# Patient Record
Sex: MALE | Race: WHITE | Employment: FULL TIME | URBAN - METROPOLITAN AREA
[De-identification: names, ages, dates, MRNs, and addresses within clinical notes are randomized per-mention and may not be internally consistent; named-entity substitution may affect disease eponyms.]

---

## 2017-09-18 ENCOUNTER — OFFICE VISIT (OUTPATIENT)
Dept: FAMILY MEDICINE CLINIC | Age: 52
End: 2017-09-18

## 2017-09-18 VITALS
BODY MASS INDEX: 24.12 KG/M2 | HEIGHT: 73 IN | DIASTOLIC BLOOD PRESSURE: 84 MMHG | HEART RATE: 96 BPM | OXYGEN SATURATION: 97 % | SYSTOLIC BLOOD PRESSURE: 146 MMHG | WEIGHT: 182 LBS | RESPIRATION RATE: 16 BRPM | TEMPERATURE: 98.2 F

## 2017-09-18 DIAGNOSIS — M79.631 RIGHT FOREARM PAIN: Primary | ICD-10-CM

## 2017-09-18 NOTE — MR AVS SNAPSHOT
Visit Information Date & Time Provider Department Dept. Phone Encounter #  
 9/18/2017  3:40 PM Yi Brunner, Stella Anaya Millan [phone] [id number] Upcoming Health Maintenance Date Due DTaP/Tdap/Td series (1 - Tdap) 5/31/1987 FOBT Q 1 YEAR AGE 50-75 5/31/2016 INFLUENZA AGE 9 TO ADULT 8/1/2017 Allergies as of 9/18/2017  Review Complete On: 9/18/2017 By: Wagner Galindo LPN No Known Allergies Current Immunizations  Reviewed on 11/11/2014 Name Date Influenza Vaccine Serena ) 11/11/2014 Not reviewed this visit You Were Diagnosed With   
  
 Codes Comments Right forearm pain    -  Primary ICD-10-CM: N28.367 ICD-9-CM: 729.5 Vitals BP Pulse Temp Resp Height(growth percentile) Weight(growth percentile) 146/84 96 98.2 °F (36.8 °C) (Oral) 16 6' 1\" (1.854 m) 182 lb (82.6 kg) SpO2 BMI Smoking Status 97% 24.01 kg/m2 Never Smoker Vitals History BMI and BSA Data Body Mass Index Body Surface Area 24.01 kg/m 2 2.06 m 2 Preferred Pharmacy Pharmacy Name Phone [facility] [place], [address] [place] AT [facility] [phone] Your Updated Medication List  
  
Notice  As of 9/18/2017  5:12 PM  
 You have not been prescribed any medications. Introducing [facility]! Dear Doris Ward: Thank you for requesting a [employer] account. Our records indicate that you already have an active [employer] account. You can access your account anytime at [email or web address] Did you know that you can access your hospital and ER discharge instructions at any time in [employer]? You can also review all of your test results from your hospital stay or ER visit. Additional Information If you have questions, please visit the Frequently Asked Questions section of the [employer] website at [email or web address] Remember, MyChart is NOT to be used for urgent needs. For medical emergencies, dial 911. Now available from your iPhone and Android! Please provide this summary of care documentation to your next provider. Your primary care clinician is listed as Chris Sherman. If you have any questions after today's visit, please call [phone].

## 2017-09-18 NOTE — PROGRESS NOTES
Chief Complaint   Patient presents with    Arm Pain     right forearm pain. .. patient states having sharp right forearm pain. .. patient states diffculty to move at times     1. Have you been to the ER, urgent care clinic since your last visit? Hospitalized since your last visit? No    2. Have you seen or consulted any other health care providers outside of the [address] [place] [address] since your last visit? Include any pap smears or colon screening.  No

## 2017-09-18 NOTE — PROGRESS NOTES
HPI     CC: R forearm pain     Tanisha Lyons is a 47 y.o. male with no significant history who presents for R forearm pain. R forearm pain started about 1 week ago, which is when he started working on a new renovation house. 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 No trauma. 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 Does not think he had increased his weight lifting at time of pain. For forearm pain, electrical stimulation with some relief and ibuprofen with no relief. Endorsed weakened R handed . No fevers or chills. Prior to that R elbow was hurting; this started at beginning of June, and stopped about 1 week ago when forearm started. For elbow pain, used compression, Ibuprofen, and electrical stimulation. No history of trauma in the past.        PMHx:  Past Medical History:   Diagnosis Date    Family history of skin cancer     Gout     Nephrolithiasis     Sun-damaged skin        Meds:   No current outpatient prescriptions on file prior to visit. No current facility-administered medications on file prior to visit. Allergies:   No Known Allergies    Smoker:  History   Smoking Status    Never Smoker   Smokeless Tobacco    Never Used       ETOH:   History   Alcohol Use No       FH:   Family History   Problem Relation Age of Onset    Cancer Mother        ROS:  Review of Systems   Constitutional: Negative for activity change, appetite change, chills, diaphoresis, fatigue and fever. Respiratory: Negative for chest tightness and shortness of breath. Cardiovascular: Negative for chest pain. Musculoskeletal: Positive for myalgias. Negative for arthralgias, back pain, gait problem and joint swelling. Skin: Negative for color change, rash and wound. Neurological: Positive for weakness. Negative for dizziness, light-headedness, numbness and headaches.        Physical Exam:  Visit Vitals    BP 146/84    Pulse 96    Temp 98.2 °F (36.8 °C) (Oral)    Resp 16    Ht 6' 1\" (1.854 m)    Wt 182 lb (82.6 kg)    SpO2 97%    BMI 24.01 kg/m2       Wt Readings from Last 3 Encounters:   09/18/17 182 lb (82.6 kg)   03/03/16 179 lb (81.2 kg)   01/15/16 179 lb (81.2 kg)     BP Readings from Last 3 Encounters:   09/18/17 146/84   03/03/16 136/86   01/15/16 138/83        Physical Exam   Constitutional: He is oriented to person, place, and time. He appears well-developed and well-nourished. No distress. HENT:   Head: Normocephalic and atraumatic. Eyes: Conjunctivae are normal. No scleral icterus. Cardiovascular: Normal rate and regular rhythm. No murmur heard. Pulmonary/Chest: Effort normal and breath sounds normal. No respiratory distress. He has no wheezes. Musculoskeletal:   Decreased ROM of R wrist; pain with flexion, extension, internal rotation, and external rotation of R wrist. R wrist nontender to palpation; sensation intact. Dorsal aspect of R forearm TTP ~ 2/3 distal to elbow; Ventral aspect of R forearm nontender. R elbow and R shoulder nontender to palpation, with sensation intact, normal ROM, and normal strength. Negative Tinel's and Phalen's. No lateral epicondylitis   Neurological: He is alert and oriented to person, place, and time. He exhibits normal muscle tone. Coordination normal.   Skin: Skin is warm and dry. No rash noted. He is not diaphoretic. Nursing note and vitals reviewed. Assessment     47 y.o. male presents with no significant history presents with R forearm pain  Patient Active Problem List   Diagnosis Code    Kidney stones N20.0    Elevated blood pressure [id number]       Today's diagnoses are:    ICD-10-CM ICD-9-CM    1. Right forearm pain M79.631 729.5               Plan     1. R forearm pain - likely 2/2 overuse and irritation of muscles and tendons, given that symptoms began soon after starting a new house renovation.  No signs of trauma, fracture, or infection. No signs of carpal tunnel.   - discussed with patient that it is important to rest his forearm and allow for muscles/ tendons to relax and for inflammation to decrease   - Ibuprofen/ NSAIDs  - discussed purchasing a wrist brace to allow for increased wrist support. Follow up as needed    Prior labs and imaging were reviewed. I have discussed the diagnosis with the patient and the intended plan as seen in the above orders. The patient has received an after-visit summary and questions were answered concerning future plans. I have discussed medication side effects and warnings with the patient as well. Patient discussed with Dr. Joanna Giron, Attending Physician.     Joselin Thompson MD, PGY2  Family Medicine Resident

## 2018-07-18 ENCOUNTER — OFFICE VISIT (OUTPATIENT)
Dept: FAMILY MEDICINE CLINIC | Age: 53
End: 2018-07-18

## 2018-07-18 ENCOUNTER — DOCUMENTATION ONLY (OUTPATIENT)
Dept: FAMILY MEDICINE CLINIC | Age: 53
End: 2018-07-18

## 2018-07-18 VITALS
BODY MASS INDEX: 24.52 KG/M2 | HEIGHT: 73 IN | SYSTOLIC BLOOD PRESSURE: 164 MMHG | OXYGEN SATURATION: 97 % | HEART RATE: 89 BPM | WEIGHT: 185 LBS | DIASTOLIC BLOOD PRESSURE: 108 MMHG | TEMPERATURE: 98.1 F | RESPIRATION RATE: 17 BRPM

## 2018-07-18 DIAGNOSIS — R42 DIZZY: Primary | ICD-10-CM

## 2018-07-18 DIAGNOSIS — I10 ESSENTIAL HYPERTENSION: ICD-10-CM

## 2018-07-18 LAB
ALBUMIN UR QL STRIP: 10 MG/L
CREATININE, URINE POC: 10 MG/DL
GLUCOSE DOSE-GTT, POCT, GLDSPOCT: 90
HGB BLD-MCNC: 14 G/DL
MICROALBUMIN/CREAT RATIO POC: <30 MG/G

## 2018-07-18 NOTE — PROGRESS NOTES
Chief Complaint   Patient presents with    Dizziness     light headed and weak feeling     1. Have you been to the ER, urgent care clinic since your last visit? Hospitalized since your last visit? No    2. Have you seen or consulted any other health care providers outside of the [address] [place] [address] since your last visit? Include any pap smears or colon screening.  No      Woke up this morning vision blurry    Back of head hurts

## 2018-07-18 NOTE — PROGRESS NOTES
Chief Complaint   Patient presents with    Dizziness     light headed and weak feeling   :    HPI  Giovanna Dixon is a 47 y.o. male who presents for a acute onset of dizziness, one week  ago and progressively worsening. the context: strep a month ago,no otitis. He has been drinking about 2 L of water a day. States it is hot at work. No sick contact  Described as lightheadedness, worsened by nothing and relieved by sitting. Associated symptoms are wobbly with walking, headaches and blurry vision bilaterally this am lasting for 3 min,fatigue, a lot of anxiety- father in hospital, colleague who passed 2 days ago. Patient has tried nothing. No current symptomsPatient denies weakness, numbness,ringing in ear, or hearing change, palpitation, fever, chills  CP/ SOB/ N/V/ diarrhea. Home BP in 140s/90s    Allergies - reviewed:   No Known Allergies    Meds - reviewed:       PMH- reviewed:  Past Medical History:   Diagnosis Date    Family history of skin cancer     Gout     Nephrolithiasis     Sun-damaged skin        SH- reviewed:  Smoker:  History   Smoking Status    Never Smoker   Smokeless Tobacco    Never Used       ETOH- reviewed:   History   Alcohol Use No       FH- reviewed:   Family History   Problem Relation Age of Onset    Cancer Mother          ROS: Positive for Items in bold: Comprehensive is neg except as in HPI       Physical Exam:  Visit Vitals    BP (!) 164/108  Comment: standing    Pulse 89    Temp 98.1 °F (36.7 °C) (Oral)    Resp 17    Ht 6' 1\" (1.854 m)    Wt 185 lb (83.9 kg)    SpO2 97%    BMI 24.41 kg/m2     Vitals:    07/18/18 1114 07/18/18 1120 07/18/18 1121 07/18/18 1122   BP: 137/84 (!) 159/99  Comment: laying down (!) 166/95  Comment: sitting (!) 164/108  Comment: standing   Pulse: (!) 101 83 88 89   Resp: 17      Temp: 98.1 °F (36.7 °C)      TempSrc: Oral      SpO2: 97%      Weight: 185 lb (83.9 kg)      Height: 6' 1\" (1.854 m)           Gen: No apparent distress.  Alert and oriented and responds to all questions appropriately. Eyes: Conjunctiva clear, extraocular movements are intact. Ear: External ears are normal. Hearing grossly normal b/l. Tympanic membranes are clear and without effusion. dixhallpike and Epley maneuver neg  Nose: Turbinates are within normal limits. No drainage. Oropharynx: No oral lesions or exudates. Dry lips and mucosa  Neck: Supple; no masses; no thyromegaly appreciated  Lungs: Respirations unlabored; clear to auscultation bilaterally  Cardio: Regular, rate, and rhythm without murmurs, gallops or rubs orthostatic dizziness +   Abdomen: Soft; nontender; nondistended; normoactive bowel sounds; no masses or organomegaly  Neurologic: No focal neurologic deficits. Normal rapid alternating movement. No nystagmus. Strength and sensation grossly intact. Coordination and gait grossly intact. Ext: Well perfused. No edema. Skin: No obvious rashes.   PHQ over the last two weeks 7/18/2018   Little interest or pleasure in doing things Not at all   Feeling down, depressed, irritable, or hopeless Several days   Total Score PHQ 2 1   Trouble falling or staying asleep, or sleeping too much Not at all   Feeling tired or having little energy Several days   Poor appetite, weight loss, or overeating Not at all   Feeling bad about yourself - or that you are a failure or have let yourself or your family down Not at all   Trouble concentrating on things such as school, work, reading, or watching TV Not at all   Moving or speaking so slowly that other people could have noticed; or the opposite being so fidgety that others notice Not at all   Thoughts of being better off dead, or hurting yourself in some way Not at all   PHQ 9 Score 2           Lab Results   Component Value Date/Time    Sodium 140 11/03/2014 11:49 AM    Potassium 5.0 11/03/2014 11:49 AM    Chloride 102 11/03/2014 11:49 AM    CO2 25 11/03/2014 11:49 AM    Anion gap 5 06/07/2011 09:50 PM    Glucose 84 11/03/2014 11:49 AM BUN 15 11/03/2014 11:49 AM    Creatinine 0.90 11/03/2014 11:49 AM    BUN/Creatinine ratio 17 11/03/2014 11:49 AM    GFR est  11/03/2014 11:49 AM    GFR est non- 11/03/2014 11:49 AM    Calcium 9.3 11/03/2014 11:49 AM     Lab Results   Component Value Date/Time    Cholesterol, total 175 11/03/2014 11:49 AM    HDL Cholesterol 37 (L) 11/03/2014 11:49 AM    LDL, calculated 129 (H) 11/03/2014 11:49 AM    VLDL, calculated 9 11/03/2014 11:49 AM    Triglyceride 46 11/03/2014 11:49 AM         I have personally reviewed the labs results    amb EKG normal  amb Hb14  amb glucose 90    Assessment and Plan:    Maritza Golden is a 47 y.o. male who presents for dizziness. Discussed with patient that symptoms may be from dehydration, but more importantly symptomatic hypertension. He refused BP meds to day. Would like to do diet and exercise first. Counseling given. He does not drinks. Snores a little. Also refused counseling for grief for his friend and dad's issue. Advised to keep hydrating. Will see back in 2 weeks with a log of BP    Advised to go to ED if any worsening symptoms or sign of stroke. Baby ASA was recommended. ICD-10-CM ICD-9-CM    1. Dizzy R42 780.4 AMB POC EKG ROUTINE W/ 12 LEADS, INTER & REP      AMB POC GLUCOSE TEST      AMB POC HEMOGLOBIN (HGB)      CBC W/O DIFF      METABOLIC PANEL, COMPREHENSIVE      MAGNESIUM      PHOSPHORUS   2. Essential hypertension Z77 049.0 METABOLIC PANEL, COMPREHENSIVE      LDL, DIRECT      AMB POC URINE, MICROALBUMIN, SEMIQUANT (3 RESULTS)       Discussed diagnoses in detail with patient. Patient expressed understanding of and agreement to above plan. All questions and concerns addressed. Medication risks/benefits/side effects discussed with patient. Patient is counseled to return to the office and/or ED if symptoms do not improve as expected. Patient discussed with Dr. Yaakov Nair, Attending Physician. Lj Sewell MD  07/18/18    Family Medicine Resident

## 2018-07-18 NOTE — PATIENT INSTRUCTIONS

## 2018-07-19 LAB
ALBUMIN SERPL-MCNC: 4.4 G/DL (ref 3.5–5.5)
ALBUMIN/GLOB SERPL: 1.6 {RATIO} (ref 1.2–2.2)
ALP SERPL-CCNC: 64 IU/L (ref 39–117)
ALT SERPL-CCNC: 24 IU/L (ref 0–44)
AST SERPL-CCNC: 15 IU/L (ref 0–40)
BILIRUB SERPL-MCNC: 0.7 MG/DL (ref 0–1.2)
BUN SERPL-MCNC: 13 MG/DL (ref 6–24)
BUN/CREAT SERPL: 13 (ref 9–20)
CALCIUM SERPL-MCNC: 8.7 MG/DL (ref 8.7–10.2)
CHLORIDE SERPL-SCNC: 99 MMOL/L (ref 96–106)
CO2 SERPL-SCNC: 23 MMOL/L (ref 20–29)
CREAT SERPL-MCNC: 0.98 MG/DL (ref 0.76–1.27)
ERYTHROCYTE [DISTWIDTH] IN BLOOD BY AUTOMATED COUNT: 13.4 % (ref 12.3–15.4)
GLOBULIN SER CALC-MCNC: 2.8 G/DL (ref 1.5–4.5)
GLUCOSE SERPL-MCNC: 75 MG/DL (ref 65–99)
HCT VFR BLD AUTO: 46.2 % (ref 37.5–51)
HGB BLD-MCNC: 14.8 G/DL (ref 13–17.7)
LDLC SERPL DIRECT ASSAY-MCNC: 119 MG/DL (ref 0–99)
MAGNESIUM SERPL-MCNC: 1.8 MG/DL (ref 1.6–2.3)
MCH RBC QN AUTO: 28.7 PG (ref 26.6–33)
MCHC RBC AUTO-ENTMCNC: 32 G/DL (ref 31.5–35.7)
MCV RBC AUTO: 90 FL (ref 79–97)
PHOSPHATE SERPL-MCNC: 3.3 MG/DL (ref 2.5–4.5)
PLATELET # BLD AUTO: 305 X10E3/UL (ref 150–379)
POTASSIUM SERPL-SCNC: 4.2 MMOL/L (ref 3.5–5.2)
PROT SERPL-MCNC: 7.2 G/DL (ref 6–8.5)
RBC # BLD AUTO: 5.16 X10E6/UL (ref 4.14–5.8)
SODIUM SERPL-SCNC: 137 MMOL/L (ref 134–144)
WBC # BLD AUTO: 5.6 X10E3/UL (ref 3.4–10.8)

## 2018-08-07 ENCOUNTER — OFFICE VISIT (OUTPATIENT)
Dept: FAMILY MEDICINE CLINIC | Age: 53
End: 2018-08-07

## 2018-08-07 VITALS
HEART RATE: 84 BPM | OXYGEN SATURATION: 96 % | RESPIRATION RATE: 18 BRPM | SYSTOLIC BLOOD PRESSURE: 147 MMHG | BODY MASS INDEX: 24.97 KG/M2 | TEMPERATURE: 97.9 F | DIASTOLIC BLOOD PRESSURE: 88 MMHG | HEIGHT: 73 IN | WEIGHT: 188.4 LBS

## 2018-08-07 DIAGNOSIS — I10 ESSENTIAL HYPERTENSION: Primary | ICD-10-CM

## 2018-08-07 RX ORDER — HYDROCHLOROTHIAZIDE 12.5 MG/1
12.5 TABLET ORAL DAILY
Qty: 30 TAB | Refills: 0 | Status: SHIPPED | OUTPATIENT
Start: 2018-08-07 | End: 2018-08-22 | Stop reason: SDUPTHER

## 2018-08-07 NOTE — PROGRESS NOTES
Identified Patient with two Patient identifiers (Name and ). Two Patient Identifiers confirmed. Reviewed record in preparation for visit and have obtained necessary documentation. Chief Complaint   Patient presents with    Blood Pressure Check     Advised per physician to check BP at home due to elevated BP levels. Patient states BP Reading in the AM are \"good\" but in the 140s in the afternoon. Visit Vitals    /88 (BP 1 Location: Left arm, BP Patient Position: Sitting)    Pulse 84    Temp 97.9 °F (36.6 °C) (Oral)    Resp 18    Ht 6' 1\" (1.854 m)    Wt 188 lb 6.4 oz (85.5 kg)    SpO2 96%    BMI 24.86 kg/m2       1. Have you been to the ER, urgent care clinic since your last visit? Hospitalized since your last visit? No    2. Have you seen or consulted any other health care providers outside of the [address] [place] [address] since your last visit? Include any pap smears or colon screening. No    Blood pressure recheck per physician.  BP Recheck performed, outcome listed below   Vitals:    18 1454 18 1627   BP: 148/77 147/88   BP 1 Location: Right arm Left arm   BP Patient Position: Sitting Sitting   Pulse: 92 84   Resp: 18    Temp: 97.9 °F (36.6 °C)    TempSrc: Oral    SpO2: 96%    Weight: 188 lb 6.4 oz (85.5 kg)    Height: 6' 1\" (1.854 m)

## 2018-08-07 NOTE — PROGRESS NOTES
CC: elevated BP      HPI:  Patient was seen previously on 7/18 for dizziness and found to have elevated BPs in 754W systolic. Patient at that  time did not desire to start antihypertensives and wanted to try lifestyle modifications. Since that visit, patient has been checking BPs and found them to be 120s in AM and 140s in PM. He denies any CP, palpitations, SOB, headache, or swelling of lower extremities. Review of Systems:    Constitutional: negative for Fever,chills  CV: negative for CP, palpitations  Resp: negative for SOB, Cough, Wheezing  GI: negative for abdominal pain, n/v/d/c     No current outpatient prescriptions on file. No Known Allergies      Past Medical History:   Diagnosis Date    Family history of skin cancer     Gout     Nephrolithiasis     Sun-damaged skin         Social History     Social History    Marital status:      Spouse name: N/A    Number of children: N/A    Years of education: N/A     Occupational History    Not on file. Social History Main Topics    Smoking status: Never Smoker    Smokeless tobacco: Never Used    Alcohol use No    Drug use: No    Sexual activity: Yes     Partners: Female     Birth control/ protection: None     Other Topics Concern    Not on file     Social History Narrative        Family History   Problem Relation Age of Onset    Cancer Mother      Physical Exam:   Visit Vitals    /77 (BP 1 Location: Right arm, BP Patient Position: Sitting)    Pulse 92    Temp 97.9 °F (36.6 °C) (Oral)    Resp 18    Ht 6' 1\" (1.854 m)    Wt 188 lb 6.4 oz (85.5 kg)    SpO2 96%    BMI 24.86 kg/m2       General appearance: alert, oriented, NAD   HEENT: PERRLA, moist mucus membranes, no LAD  CV: RRR, S1/S2 heard, no m/r/g  Resp: CTAB, no w/r/r  Abdominal: soft, non-tender to palpation, +BS  Extremities: no swelling or edema   Skin: no visible rashes    Assessment/Plan:     1. Essential hypertension: 48yo M with newly diagnosed Hypertension. - Counseled patient with regards to HTN diagnosis and lifestyle modifications including low sat diet and regular exercise  -Will start HCTZ 12.5mg daily  - Advised patient to check BP daily and log values and return to clinic in 2wks for BP check       Patient discussed with Dr. Tara Fletcher MD  Family Medicine Resident

## 2018-08-07 NOTE — MR AVS SNAPSHOT
[address] 
[phone] Patient: Kaylyn Waters MRN: [id number] GLF:[id number] Visit Information Date & Time Provider Department Dept. Phone Encounter #  
 8/7/2018  3:30 PM Femi Dimas MD [address] [place] [address] [phone] [id number] Upcoming Health Maintenance Date Due DTaP/Tdap/Td series (1 - Tdap) 5/31/1987 FOBT Q 1 YEAR AGE 50-75 5/31/2016 Influenza Age 5 to Adult 8/1/2018 Allergies as of 8/7/2018  Review Complete On: 8/7/2018 By: Vivek Izquierdo LPN No Known Allergies Current Immunizations  Reviewed on 11/11/2014 Name Date Influenza Vaccine Constantin Winfield) 11/11/2014 Not reviewed this visit You Were Diagnosed With   
  
 Codes Comments Essential hypertension    -  Primary ICD-10-CM: I10 
ICD-9-CM: 401.9 Vitals BP Pulse Temp Resp Height(growth percentile) Weight(growth percentile) 148/77 (BP 1 Location: Right arm, BP Patient Position: Sitting) 92 97.9 °F (36.6 °C) (Oral) 18 6' 1\" (1.854 m) 188 lb 6.4 oz (85.5 kg) SpO2 BMI Smoking Status 96% 24.86 kg/m2 Never Smoker Vitals History BMI and BSA Data Body Mass Index Body Surface Area  
 24.86 kg/m 2 2.1 m 2 Preferred Pharmacy Pharmacy Name Phone [facility] [place], [address] [place] AT [facility] [phone] Your Updated Medication List  
  
   
This list is accurate as of 8/7/18  4:27 PM.  Always use your most recent med list.  
  
  
  
  
 hydroCHLOROthiazide 12.5 mg tablet Commonly known as:  HYDRODIURIL Take 1 Tab by mouth daily. Prescriptions Sent to Pharmacy Refills  
 hydroCHLOROthiazide (HYDRODIURIL) 12.5 mg tablet 0 Sig: Take 1 Tab by mouth daily.   
 Class: Normal  
 Pharmacy: [facility], [address] AT [facility] #: [phone] Route: Oral  
  
Introducing \[facility]\"" [facility]! Dear Liz Kim: Thank you for requesting a [employer] account. Our records indicate that you already have an active [employer] account. You can access your account anytime at [email or web address] Did you know that you can access your hospital and ER discharge instructions at any time in [employer]? You can also review all of your test results from your hospital stay or ER visit. Additional Information If you have questions, please visit the Frequently Asked Questions section of the [employer] website at [email or web address] Remember, [employer] is NOT to be used for urgent needs. For medical emergencies, dial 911. Now available from your iPhone and Android! Please provide this summary of care documentation to your next provider. Your primary care clinician is listed as Presley Toledo. If you have any questions after today's visit, please call [phone].

## 2018-08-22 ENCOUNTER — OFFICE VISIT (OUTPATIENT)
Dept: FAMILY MEDICINE CLINIC | Age: 53
End: 2018-08-22

## 2018-08-22 VITALS
HEIGHT: 73 IN | RESPIRATION RATE: 16 BRPM | HEART RATE: 76 BPM | TEMPERATURE: 98.2 F | WEIGHT: 186 LBS | DIASTOLIC BLOOD PRESSURE: 82 MMHG | SYSTOLIC BLOOD PRESSURE: 119 MMHG | OXYGEN SATURATION: 98 % | BODY MASS INDEX: 24.65 KG/M2

## 2018-08-22 DIAGNOSIS — I10 ESSENTIAL HYPERTENSION: ICD-10-CM

## 2018-08-22 RX ORDER — HYDROCHLOROTHIAZIDE 12.5 MG/1
12.5 TABLET ORAL DAILY
Qty: 30 TAB | Refills: 0 | Status: SHIPPED | OUTPATIENT
Start: 2018-08-05 | End: 2018-09-06 | Stop reason: ALTCHOICE

## 2018-08-22 NOTE — PROGRESS NOTES
CC: BP check     HPI:    Patient reports that his BP is in 110s at home. He has episodes of feeling lightheaded, dizzy. He states that his BP was 110s when it occurred most recently. These episodes occur every 2-3 days. He does note that these symptoms were occurring even prior to starting his HCTZ. They have also been associated with being out in the sun for prolonged periods of time and don't occur indoors. Review of Systems:    Constitutional: negative for Fever,chills  Neuro: + dizziness and lightheadedness   CV: negative for CP, palpitations  Resp: negative for SOB, Cough, Wheezing  GI: negative for abdominal pain, n/v/d/c       Current Outpatient Prescriptions:     hydroCHLOROthiazide (HYDRODIURIL) 12.5 mg tablet, Take 1 Tab by mouth daily. , Disp: 30 Tab, Rfl: 0    No Known Allergies    Past Medical History:   Diagnosis Date    Family history of skin cancer     Gout     Nephrolithiasis     Sun-damaged skin         Social History     Social History    Marital status:      Spouse name: N/A    Number of children: N/A    Years of education: N/A     Occupational History    Not on file.      Social History Main Topics    Smoking status: Never Smoker    Smokeless tobacco: Never Used    Alcohol use No    Drug use: No    Sexual activity: Yes     Partners: Female     Birth control/ protection: None     Other Topics Concern    Not on file     Social History Narrative          Family History   Problem Relation Age of Onset    Cancer Mother          Physical Exam:     Visit Vitals    /82    Pulse 76    Temp 98.2 °F (36.8 °C) (Oral)    Resp 16    Ht 6' 1\" (1.854 m)    Wt 186 lb (84.4 kg)    SpO2 98%    BMI 24.54 kg/m2       General appearance: alert, oriented, NAD   HEENT: PERRLA, moist mucus membranes, no LAD  CV: RRR, S1/S2 heard, no m/r/g  Resp: CTAB, no w/r/r  Abdominal: soft, non-tender to palpation, +BS  Extremities: no swelling or edema   Skin: no visible steff      Assessment/Plan:   1. Essential hypertension: BP stable on current therapy. Current symptoms of lightheadedness and dizziness seem to be related to heat/environmental factors.   - Continue HCTZ 12.5mg daily  - Patient will continue to check BP daily  - Follow up in 2 wks for BP check       Patient discussed with Dr. Jose Raul Suazo MD  Family Medicine Resident

## 2018-08-22 NOTE — MR AVS SNAPSHOT
[address] 
[phone] Patient: Adela Brock MRN: [id number] ZQE:0/71/3802 Visit Information Date & Time Provider Department Dept. Phone Encounter #  
 8/22/2018  9:45 AM Jaime Erazo MD [address] [facility] [phone] [id number] Upcoming Health Maintenance Date Due DTaP/Tdap/Td series (1 - Tdap) 5/31/1987 FOBT Q 1 YEAR AGE 50-75 5/31/2016 Influenza Age 5 to Adult 8/1/2018 Allergies as of 8/22/2018  Review Complete On: 8/22/2018 By: Regina Harris LPN No Known Allergies Current Immunizations  Reviewed on 11/11/2014 Name Date Influenza Vaccine Gabriela Chapo) 11/11/2014 Not reviewed this visit You Were Diagnosed With   
  
 Codes Comments Essential hypertension     ICD-10-CM: I10 
ICD-9-CM: 401.9 Vitals BP Pulse Temp Resp Height(growth percentile) Weight(growth percentile) 119/82 76 98.2 °F (36.8 °C) (Oral) 16 6' 1\" (1.854 m) 186 lb (84.4 kg) SpO2 BMI Smoking Status 98% 24.54 kg/m2 Never Smoker Vitals History BMI and BSA Data Body Mass Index Body Surface Area 24.54 kg/m 2 2.08 m 2 Preferred Pharmacy Pharmacy Name Phone [facility] [place], [address] [place] AT [facility] [phone] Your Updated Medication List  
  
   
This list is accurate as of 8/22/18 11:01 AM.  Always use your most recent med list.  
  
  
  
  
 hydroCHLOROthiazide 12.5 mg tablet Commonly known as:  HYDRODIURIL Take 1 Tab by mouth daily. Prescriptions Printed Refills  
 hydroCHLOROthiazide (HYDRODIURIL) 12.5 mg tablet 0 Sig: Take 1 Tab by mouth daily. Class: Print Route: Oral  
  
Introducing [facility]! Dear Samuel Donovan: Thank you for requesting a LemonQuestt account.   Our records indicate that you already have an active 2d2c account. You can access your account anytime at [email or web address] Did you know that you can access your hospital and ER discharge instructions at any time in 2d2c? You can also review all of your test results from your hospital stay or ER visit. Additional Information If you have questions, please visit the Frequently Asked Questions section of the 2d2c website at [email or web address] Remember, 2d2c is NOT to be used for urgent needs. For medical emergencies, dial 911. Now available from your iPhone and Android! Please provide this summary of care documentation to your next provider. Your primary care clinician is listed as Ely Arshad. If you have any questions after today's visit, please call [phone].

## 2018-09-06 ENCOUNTER — OFFICE VISIT (OUTPATIENT)
Dept: FAMILY MEDICINE CLINIC | Age: 53
End: 2018-09-06

## 2018-09-06 VITALS
WEIGHT: 185 LBS | SYSTOLIC BLOOD PRESSURE: 132 MMHG | TEMPERATURE: 98.1 F | HEIGHT: 73 IN | RESPIRATION RATE: 18 BRPM | OXYGEN SATURATION: 96 % | BODY MASS INDEX: 24.52 KG/M2 | HEART RATE: 75 BPM | DIASTOLIC BLOOD PRESSURE: 86 MMHG

## 2018-09-06 DIAGNOSIS — I10 ESSENTIAL HYPERTENSION: Primary | ICD-10-CM

## 2018-09-06 DIAGNOSIS — Z12.11 COLON CANCER SCREENING: ICD-10-CM

## 2018-09-06 DIAGNOSIS — Z13.31 SCREENING FOR DEPRESSION: ICD-10-CM

## 2018-09-06 DIAGNOSIS — Z63.6 CAREGIVER STRESS: ICD-10-CM

## 2018-09-06 DIAGNOSIS — F43.21 GRIEF: ICD-10-CM

## 2018-09-06 DIAGNOSIS — Z23 NEED FOR TDAP VACCINATION: ICD-10-CM

## 2018-09-06 DIAGNOSIS — Z28.21 INFLUENZA VACCINATION DECLINED: ICD-10-CM

## 2018-09-06 RX ORDER — BISOPROLOL FUMARATE AND HYDROCHLOROTHIAZIDE 2.5; 6.25 MG/1; MG/1
1 TABLET ORAL DAILY
Qty: 30 TAB | Refills: 3 | Status: SHIPPED | OUTPATIENT
Start: 2018-09-06 | End: 2018-09-25

## 2018-09-06 SDOH — SOCIAL STABILITY - SOCIAL INSECURITY: DEPENDENT RELATIVE NEEDING CARE AT HOME: Z63.6

## 2018-09-06 NOTE — PROGRESS NOTES
1. Have you been to the ER, urgent care clinic since your last visit? Hospitalized since your last visit? no 
 
2. Have you seen or consulted any other health care providers outside of the [address] [place] [address] since your last visit? Include any pap smears or colon screening. No 
 
Chief Complaint Patient presents with  Blood Pressure Check Blood pressure 132/86, pulse 75, temperature 98.1 °F (36.7 °C), temperature source Oral, resp. rate 18, height 6' 1\" (1.854 m), weight 185 lb (83.9 kg), SpO2 96 %.

## 2018-09-06 NOTE — PATIENT INSTRUCTIONS
Obtain a flu vaccine around Google Return your stool cards when ready Consider colonoscopy , Dr. Geetha Catherine #[phone] Stop plain hydrochlorothiazide. Start bisoprolol one pill daily Follow up in 2-3 weeks for BP check Drink plenty of fluids

## 2018-09-06 NOTE — PROGRESS NOTES
Francis Montalvo is a 47 y.o. male Issues discussed today include: 
 
 
BP check:  132/86 but lower at home he says. He is on HCTZ 12.5 mg daily He lost his dad recently. Lots of stress having to help his mom. PHQ9 = 2, GAD7 - 1. He declined buspar or counseling Signs and symptoms:  He's had episodes of dizziness Duration:  weeks Context:  BEFORE starting HCTZ Location:  Sounds like positional 
Quality: ? Maribel Louis Severity:  No near syncope/CP/palpitations Timing:  Comes goes Modifying factors:  +stress No orthostasis today I could not reproduce sx with DHPM 
 
Data reviewed or ordered today:  I reviewed labs from 7/2018 Other problems include: 
Patient Active Problem List  
Diagnosis Code  Kidney stones N20.0  Essential hypertension I10 Medications: 
Current Outpatient Prescriptions Medication Sig Dispense Refill  bisoprolol-hydroCHLOROthiazide (ZIAC) 2.5-6.25 mg per tablet Take 1 Tab by mouth daily. 30 Tab 3 Allergies: 
No Known Allergies LMP:  No LMP for male patient. Social History Social History  Marital status:  Spouse name: N/A  
 Number of children: N/A  
 Years of education: N/A Occupational History  Not on file. Social History Main Topics  Smoking status: Never Smoker  Smokeless tobacco: Never Used  Alcohol use No  
 Drug use: No  
 Sexual activity: Yes  
  Partners: Female Birth control/ protection: None Other Topics Concern  Not on file Social History Narrative Family History Problem Relation Age of Onset  Cancer Mother Other family history:  Lost dad recently Meaningful use:  done ROS: 
Headaches:  no 
Chest Pain:  no 
SOB:  no 
Fevers:  no Falls:  no 
anxiety/depression/losing interest in doing things that were previously enjoyed:  no. PHQ2 = 2, PHQ9 = 2 Other significant ROS:  No tobacco or ETOH No LMP for male patient. Physical Exam 
Visit Vitals  /86 (BP 1 Location: Right arm, BP Patient Position: Sitting)  Pulse 75  Temp 98.1 °F (36.7 °C) (Oral)  Resp 18  Ht 6' 1\" (1.854 m)  Wt 185 lb (83.9 kg)  SpO2 96%  BMI 24.41 kg/m2 BP Readings from Last 3 Encounters:  
09/06/18 132/86  
08/22/18 119/82  
08/07/18 147/88 Constitutional:  Appears well,  No Acute Distress, Vitals noted Psychiatric:   Affect normal, Alert and cooperative, Oriented to person/place/time Eyes:   Pupils equally round and reactive, EOMI, conjunctiva clear, eyelids normal 
ENT:   External ears and nose normal/lips, teeth=OK/gums normal, TMs and Orophyarynx normal 
Neck:   general inspection and Thyroid normal.  No abnormal cervical or supraclavicular nodes Lungs:   clear to auscultation, good respiratory effort Heart: Ausculation normal.  Regular rhythm. No cardiac murmurs. No carotid bruits or palpable thrills Chest wall normal 
Abdominal exam:   normal.  Liver and spleen normal.  No bruits/masses/tenderness Extremities:   without edema, good peripheral pulses Skin:   Warm to palpation, without rashes, bruising, or suspicious lesions Neuro:  No facial droop, speech fluent, EOMI, Pupils equally round and reactive to light, visual fields seem OK, hearing seems normal and symmetrical,smile symmetrical, puffs out cheeks symmetrically Shoulder shrug symmetrical 
  
moves all extremities, strength/sensation seems intact and symmetrical 
 
Rapid alternating movements of hands normal and symmetrical 
 
balance seems OK, no pronator drift, gait normal. \"get up and go\" test OK 
 
squats OK, heel standing/toe standing OK 
 
no tenderness of C spine, T spine, LS spine, flexion/extension of spine OK Affect seems appropriate, no obvious mental processing problems MSK:  Full passive ROM all joints Assessment:   
Patient Active Problem List  
Diagnosis Code  Kidney stones N20.0  Essential hypertension I10  
 
 
 Today's diagnoses are: ICD-10-CM ICD-9-CM 1. Essential hypertension I10 401.9 bisoprolol-hydroCHLOROthiazide (ZIAC) 2.5-6.25 mg per tablet 2. Caregiver stress Z63.6 V61.49   
3. Grief F43.20 309.0 4. Need for Tdap vaccination Z23 V06.1 TETANUS, DIPHTHERIA TOXOIDS AND ACELLULAR PERTUSSIS VACCINE (TDAP), IN INDIVIDS. >=7, IM  
5. Influenza vaccination declined Z28.21 V64.06   
6. Colon cancer screening Z12.11 V76.51 REFERRAL TO GASTROENTEROLOGY OCCULT BLOOD IMMUNOASSAY,DIAGNOSTIC 7. Screening for depression Z13.89 V79.0 [id number] Plan: 
Orders Placed This Encounter  TETANUS, DIPHTHERIA TOXOIDS AND ACELLULAR PERTUSSIS VACCINE (TDAP), IN INDIVIDS. >=7, IM  
 OCCULT BLOOD IMMUNOASSAY,DIAGNOSTIC  [facility] Referral Priority:   Routine Referral Type:   Consultation Referral Reason:   Specialty Services Required Referral Location:   [facility]  
  Referred to Provider:   Bethel Mason MD  
  Requested Specialty:   Gastroenterology [address]  bisoprolol-hydroCHLOROthiazide (ZIAC) 2.5-6.25 mg per tablet Sig: Take 1 Tab by mouth daily. Dispense:  30 Tab Refill:  3 Replaces plain hydrochlorothiazide See patient instructions Patient Instructions Obtain a flu vaccine around Google Return your stool cards when ready Consider colonoscopy , Dr. Dante Verdugo #[phone] Stop plain hydrochlorothiazide. Start bisoprolol one pill daily Follow up in 2-3 weeks for BP check Drink plenty of fluids 
 
 
 
 
 
refresh note:  done AVS Printed:  done Diagnoses and all orders for this visit: 1. Essential hypertension 
-     bisoprolol-hydroCHLOROthiazide (ZIAC) 2.5-6.25 mg per tablet; Take 1 Tab by mouth daily. 2. Caregiver stress 3. Grief 4. Need for Tdap vaccination -     TETANUS, DIPHTHERIA TOXOIDS AND ACELLULAR PERTUSSIS VACCINE (TDAP), IN INDIVIDS. >=7, IM 
 
 5. Influenza vaccination declined 6. Colon cancer screening -     [facility] 
-     OCCULT BLOOD IMMUNOASSAY,DIAGNOSTIC 7. Screening for depression -     [address] We discussed health maintenance:  Needs Tdap today. He wants to wait on flu BMI = Body mass index is 24.41 kg/(m^2). We discussed diet/exercise/healthy weight We discussed avoiding tobacco products We reviewed and updated pertinent past medical history in the problem list

## 2018-09-06 NOTE — MR AVS SNAPSHOT
[address] 
[phone] Patient: Say Laguna MRN: [id number] UJI:[birth year] Visit Information Date & Time Provider Department Dept. Phone Encounter #  
 9/6/2018  8:40 AM Maicol Kumari MD [address] [place] [address] [phone] [id number] Upcoming Health Maintenance Date Due DTaP/Tdap/Td series (1 - Tdap) 5/31/1987 FOBT Q 1 YEAR AGE 50-75 5/31/2016 Influenza Age 5 to Adult 8/1/2018 Allergies as of 9/6/2018  Review Complete On: 9/6/2018 By: Maicol Kumari MD  
 No Known Allergies Current Immunizations  Reviewed on 9/6/2018 Name Date Influenza Vaccine Leila Victor Hugo) 11/11/2014 Tdap  Incomplete Reviewed by Debbie Lynch LPN on 0/8/0449 at  1:24 AM  
You Were Diagnosed With   
  
 Codes Comments Essential hypertension    -  Primary ICD-10-CM: I10 
ICD-9-CM: 401.9 Caregiver stress     ICD-10-CM: Z63.6 ICD-9-CM: V61.49 Grief     ICD-10-CM: S31.04 ICD-9-CM: 309.0 Need for Tdap vaccination     ICD-10-CM: F27 ICD-9-CM: V06.1 Influenza vaccination declined     ICD-10-CM: M65.82 ICD-9-CM: V64.06 Colon cancer screening     ICD-10-CM: Z12.11 ICD-9-CM: V76.51 Vitals BP Pulse Temp Resp Height(growth percentile) Weight(growth percentile) 132/86 (BP 1 Location: Right arm, BP Patient Position: Sitting) 75 98.1 °F (36.7 °C) (Oral) 18 6' 1\" (1.854 m) 185 lb (83.9 kg) SpO2 BMI Smoking Status 96% 24.41 kg/m2 Never Smoker BMI and BSA Data Body Mass Index Body Surface Area  
 24.41 kg/m 2 2.08 m 2 Preferred Pharmacy Pharmacy Name Phone [facility] [place], [address] [place] AT [facility] [phone] Your Updated Medication List  
  
   
This list is accurate as of 9/6/18  9:38 AM.  Always use your most recent med list.  
  
  
  
  
 bisoprolol-hydroCHLOROthiazide 2.5-6.25 mg per tablet Commonly known as:  [facility] Take 1 Tab by mouth daily. Prescriptions Sent to Pharmacy Refills  
 bisoprolol-hydroCHLOROthiazide (ZIAC) 2.5-6.25 mg per tablet 3 Sig: Take 1 Tab by mouth daily. Class: Normal  
 Pharmacy: [facility] [address] [facility] #: [phone] Route: Oral  
  
We Performed the Following OCCULT BLOOD IMMUNOASSAY,DIAGNOSTIC [11332 CPT(R)] REFERRAL TO GASTROENTEROLOGY [OCZ90 Custom] Comments:  
 Colonoscopy Dr. Mosley See #[phone] TETANUS, DIPHTHERIA TOXOIDS AND ACELLULAR PERTUSSIS VACCINE (TDAP), IN INDIVIDS. >=7, IM [id number] CPT(R)] Referral Information Referral ID Referred By Referred To  
  
 [id number] [facility] [address]  [place], [address] [facility] Visits Status Start Date End Date 1 New Request 9/6/18 9/6/19 If your referral has a status of pending review or denied, additional information will be sent to support the outcome of this decision. Patient Instructions Obtain a flu vaccine around Google Return your stool cards when ready Consider colonoscopy , Dr. Mosley See #[phone] Stop plain hydrochlorothiazide. Start bisoprolol one pill daily Follow up in 2-3 weeks for BP check Drink plenty of fluids Introducing [facility]! Dear Brandt Holding: Thank you for requesting a Enverv account. Our records indicate that you already have an active Enverv account. You can access your account anytime at [email or web address] Did you know that you can access your hospital and ER discharge instructions at any time in Enverv? You can also review all of your test results from your hospital stay or ER visit. Additional Information If you have questions, please visit the Frequently Asked Questions section of the Viamericashart website at [email or web address] Remember, [employer] Biosystems is NOT to be used for urgent needs. For medical emergencies, dial 911. Now available from your iPhone and Android! Please provide this summary of care documentation to your next provider. Your primary care clinician is listed as Orly Miranda. If you have any questions after today's visit, please call [phone].

## 2018-09-20 ENCOUNTER — OFFICE VISIT (OUTPATIENT)
Dept: FAMILY MEDICINE CLINIC | Age: 53
End: 2018-09-20

## 2018-09-20 VITALS
BODY MASS INDEX: 24.78 KG/M2 | WEIGHT: 187 LBS | HEIGHT: 73 IN | TEMPERATURE: 98.2 F | DIASTOLIC BLOOD PRESSURE: 83 MMHG | HEART RATE: 107 BPM | SYSTOLIC BLOOD PRESSURE: 138 MMHG | OXYGEN SATURATION: 97 % | RESPIRATION RATE: 18 BRPM

## 2018-09-20 DIAGNOSIS — R06.09 DYSPNEA ON EXERTION: Primary | ICD-10-CM

## 2018-09-20 DIAGNOSIS — F43.22 ADJUSTMENT REACTION WITH ANXIETY: ICD-10-CM

## 2018-09-20 DIAGNOSIS — I10 ESSENTIAL HYPERTENSION: ICD-10-CM

## 2018-09-20 DIAGNOSIS — Z91.09 ENVIRONMENTAL ALLERGIES: ICD-10-CM

## 2018-09-20 DIAGNOSIS — E78.00 ELEVATED LDL CHOLESTEROL LEVEL: ICD-10-CM

## 2018-09-20 RX ORDER — BUSPIRONE HYDROCHLORIDE 10 MG/1
10 TABLET ORAL
Qty: 30 TAB | Refills: 1 | Status: SHIPPED | OUTPATIENT
Start: 2018-09-20

## 2018-09-20 RX ORDER — FLUTICASONE PROPIONATE 50 MCG
2 SPRAY, SUSPENSION (ML) NASAL DAILY
Qty: 1 BOTTLE | Refills: 11 | Status: SHIPPED | OUTPATIENT
Start: 2018-09-20

## 2018-09-20 NOTE — PROGRESS NOTES
Jesus Duenas is a 47 y.o. male      Issues discussed today include:        Signs and symptoms:  Exertional dyspnea (going up steps)  Duration:  weeks  Context:  He stopped Ziac and is on no BP Rx  Location:  No CP  Quality:  SOB at top of stairs  Severity:  He feels light headed sometimes  Timing:  His HOLT has improved he says  Modifying factors:  Lost his dad recently, +stress    Cardiac risks:    Positive =  Hypertension Family history CAD  Male, Lipid disorder ()  Negative =  Diabetes  Present for former smoker              Data reviewed or ordered today:  EKG normal 7/2018, repeat today = normal    Other problems include:  Patient Active Problem List   Diagnosis Code    Kidney stones N20.0    Essential hypertension I10       Medications:  Current Outpatient Prescriptions   Medication Sig Dispense Refill    fluticasone (FLONASE) 50 mcg/actuation nasal spray 2 Sprays by Both Nostrils route daily. 1 Bottle 11    bisoprolol-hydroCHLOROthiazide (ZIAC) 2.5-6.25 mg per tablet Take 1 Tab by mouth daily. 30 Tab 3       Allergies:  No Known Allergies    LMP:  No LMP for male patient. Social History     Social History    Marital status:      Spouse name: N/A    Number of children: N/A    Years of education: N/A     Occupational History    Not on file. Social History Main Topics    Smoking status: Never Smoker    Smokeless tobacco: Never Used    Alcohol use No    Drug use: No    Sexual activity: Yes     Partners: Female     Birth control/ protection: None     Other Topics Concern    Not on file     Social History Narrative         Family History   Problem Relation Age of Onset   Glaclarastine Mower Cancer Mother      Other family history:  Dad had stents    Meaningful use:  done      ROS:  Headaches:  no  Chest Pain:  no  SOB:  +HOLT  Fevers:  no  Falls:  no  Other significant ROS:  +stress    No LMP for male patient.     Physical Exam  Visit Vitals    /83 (BP 1 Location: Right arm, BP Patient Position: Sitting)    Pulse (!) 107    Temp 98.2 °F (36.8 °C) (Oral)    Resp 18    Ht 6' 1\" (1.854 m)    Wt 187 lb (84.8 kg)    SpO2 97%    BMI 24.67 kg/m2     BP Readings from Last 3 Encounters:   09/20/18 138/83   09/06/18 132/86   08/22/18 119/82     Constitutional:  Appears well,  No Acute Distress, Vitals noted  Psychiatric:   Affect normal, Alert and cooperative, Oriented to person/place/time    Eyes:   Pupils equally round and reactive, EOMI, conjunctiva clear, eyelids normal  ENT:   External ears and nose normal/lips, teeth=OK/gums normal, TMs and Orophyarynx normal  Neck:   general inspection and Thyroid normal.  No abnormal cervical or supraclavicular nodes    Lungs:   clear to auscultation, good respiratory effort  Heart: Ausculation normal.  Regular rhythm. No cardiac murmurs. No carotid bruits or palpable thrills  Chest wall normal  Abdominal exam:   normal.  Liver and spleen normal.  No bruits/masses/tenderness    Extremities:   without edema, good peripheral pulses  Skin:   Warm to palpation, without rashes, bruising, or suspicious lesions           Assessment:    Patient Active Problem List   Diagnosis Code    Kidney stones N20.0    Essential hypertension I10       Today's diagnoses are:    ICD-10-CM ICD-9-CM    1. Dyspnea on exertion R06.09 786.09 AMB POC EKG ROUTINE W/ 12 LEADS, INTER & REP      TSH 3RD GENERATION      METABOLIC PANEL, BASIC      ALT      HEMOGLOBIN A1C WITH EAG      CBC W/O DIFF      REFERRAL TO CARDIOLOGY   2. Elevated LDL cholesterol level E78.00 272.0 LIPID PANEL   3. Environmental allergies Z91.09 V15.09 fluticasone (FLONASE) 50 mcg/actuation nasal spray   4. Essential hypertension I10 401.9     138/83 on NO Rx   5.  Adjustment reaction with anxiety F43.22 309.24        Plan:  Orders Placed This Encounter    TSH 3RD GENERATION    LIPID PANEL    METABOLIC PANEL, BASIC    ALT    HEMOGLOBIN A1C WITH EAG    CBC W/O DIFF   Luana Escobedo [facility]     Referral Priority: Routine     Referral Type:   Consultation     Referral Reason:   Specialty Services Required     Referred to Provider:   Jenn Goodman MD     Requested Specialty:   Cardiology    AMB POC EKG ROUTINE W/ 12 LEADS, INTER & REP     Order Specific Question:   Reason for Exam:     Answer:   HOLT    fluticasone (FLONASE) 50 mcg/actuation nasal spray     Si Sprays by Both Nostrils route daily. Dispense:  1 Bottle     Refill:  11       See patient instructions  There are no Patient Instructions on file for this visit. refresh note:  done    AVS Printed:  done    Diagnoses and all orders for this visit:    1. Dyspnea on exertion  -     AMB POC EKG ROUTINE W/ 12 LEADS, INTER & REP  -     TSH 3RD GENERATION  -     METABOLIC PANEL, BASIC  -     ALT  -     HEMOGLOBIN A1C WITH EAG  -     CBC W/O DIFF  -     [facility]    2. Elevated LDL cholesterol level  -     LIPID PANEL    3. Environmental allergies  -     fluticasone (FLONASE) 50 mcg/actuation nasal spray; 2 Sprays by Both Nostrils route daily. 4. Essential hypertension  Comments:  138/83 on NO Rx    5.  Adjustment reaction with anxiety

## 2018-09-20 NOTE — PROGRESS NOTES
Chief Complaint   Patient presents with    Hypertension     patient declined Influenza Vaccine     1. Have you been to the ER, urgent care clinic since your last visit? Hospitalized since your last visit? No    2. Have you seen or consulted any other health care providers outside of the [address] [place] [address] since your last visit? Include any pap smears or colon screening. No     Reviewed record in preparation for visit and have obtained necessary documentation.

## 2018-09-20 NOTE — PATIENT INSTRUCTIONS
Your EKG was normal    See Dr. Tania Hernandez for heart check #[phone]    Chest xray today    Fasting labs today    Start aspirin 81 mg daily until you see Dr. Tania Hernandez    Consider using buspar for stress    Use flonase for allergies

## 2018-09-20 NOTE — MR AVS SNAPSHOT
[address] 
[phone] Patient: Reina Denise MRN: [id number] B:8/53/3955 Visit Information Date & Time Provider Department Dept. Phone Encounter #  
 9/20/2018  9:40 AM Oren Schafer MD [address] [place] [address] [phone] [id number] Upcoming Health Maintenance Date Due Influenza Age 5 to Adult 9/6/2019* FOBT Q 1 YEAR AGE 50-75 9/6/2019* DTaP/Tdap/Td series (2 - Td) 9/6/2028 *Topic was postponed. The date shown is not the original due date. Allergies as of 9/20/2018  Review Complete On: 9/20/2018 By: Edd Hyman LPN No Known Allergies Current Immunizations  Reviewed on 9/6/2018 Name Date Influenza Vaccine Arletha Jennifer) 11/11/2014 Tdap 9/6/2018 Not reviewed this visit You Were Diagnosed With   
  
 Codes Comments Dyspnea on exertion    -  Primary ICD-10-CM: R06.09 
ICD-9-CM: 786.09 Elevated LDL cholesterol level     ICD-10-CM: E78.00 ICD-9-CM: 272.0 Environmental allergies     ICD-10-CM: Z91.09 
ICD-9-CM: V15.09 Essential hypertension     ICD-10-CM: I10 
ICD-9-CM: 401.9 138/83 on NO Rx Adjustment reaction with anxiety     ICD-10-CM: F43.22 
ICD-9-CM: 309.24 Vitals BP Pulse Temp Resp Height(growth percentile) Weight(growth percentile) 138/83 (BP 1 Location: Right arm, BP Patient Position: Sitting) (!) 107 98.2 °F (36.8 °C) (Oral) 18 6' 1\" (1.854 m) 187 lb (84.8 kg) SpO2 BMI Smoking Status 97% 24.67 kg/m2 Never Smoker Vitals History BMI and BSA Data Body Mass Index Body Surface Area  
 24.67 kg/m 2 2.09 m 2 Preferred Pharmacy Pharmacy Name Phone [facility] DR[facility] STORE [place], [address] [place] AT [facility] [phone] Your Updated Medication List  
  
   
This list is accurate as of 9/20/18 10:04 AM.  Always use your most recent med list.  
 bisoprolol-hydroCHLOROthiazide 2.5-6.25 mg per tablet Commonly known as:  [facility] Take 1 Tab by mouth daily. busPIRone 10 mg tablet Commonly known as:  BUSPAR Take 1 Tab by mouth two (2) times daily as needed. fluticasone 50 mcg/actuation nasal spray Commonly known as:  Euna Rohan 2 Sprays by Both Nostrils route daily. Prescriptions Printed Refills  
 busPIRone (BUSPAR) 10 mg tablet 1 Sig: Take 1 Tab by mouth two (2) times daily as needed. Class: Print Route: Oral  
  
Prescriptions Sent to Pharmacy Refills  
 fluticasone (FLONASE) 50 mcg/actuation nasal spray 11 Si Sprays by Both Nostrils route daily. Class: Normal  
 Pharmacy: [facility] [address] #: [phone] Route: Both Nostrils We Performed the Following ALT [id number] CPT(R)] AMB POC EKG ROUTINE W/ 12 LEADS, INTER & REP [26069 CPT(R)] CBC W/O DIFF [32047 CPT(R)] HEMOGLOBIN A1C WITH EAG [36800 CPT(R)] LIPID PANEL [89533 CPT(R)] METABOLIC PANEL, BASIC [53572 CPT(R)] REFERRAL TO CARDIOLOGY [LBM45 Custom] Comments:  
 Dr. Juliane Bradford #[phone] [facility] 3RD GENERATION [56247 CPT(R)] To-Do List   
 2018 Imaging:  XR CHEST PA LAT Referral Information Referral ID Referred By Referred To  
  
 [id number] Nan Naranjo MD   
   [address] [place] [address] Phone: [phone] Fax: [phone] Visits Status Start Date End Date 1 New Request 18 If your referral has a status of pending review or denied, additional information will be sent to support the outcome of this decision. Patient Instructions Your EKG was normal 
 
See Dr. Juliane Bradford for heart check #[phone] Chest xray today Fasting labs today Start aspirin 81 mg daily until you see Dr. Juliane Bradford Consider using buspar for stress Introducing [facility] & HEALTH SERVICES! Dear Liz Kim: Thank you for requesting a SongAfter account. Our records indicate that you already have an active SongAfter account. You can access your account anytime at [email or web address] Did you know that you can access your hospital and ER discharge instructions at any time in SongAfter? You can also review all of your test results from your hospital stay or ER visit. Additional Information If you have questions, please visit the Frequently Asked Questions section of the SongAfter website at [email or web address] Remember, SongAfter is NOT to be used for urgent needs. For medical emergencies, dial 911. Now available from your iPhone and Android! Please provide this summary of care documentation to your next provider. Your primary care clinician is listed as Presley Toledo. If you have any questions after today's visit, please call [phone].

## 2018-09-21 LAB
ALT SERPL-CCNC: 27 IU/L (ref 0–44)
BUN SERPL-MCNC: 12 MG/DL (ref 6–24)
BUN/CREAT SERPL: 11 (ref 9–20)
CALCIUM SERPL-MCNC: 9.1 MG/DL (ref 8.7–10.2)
CHLORIDE SERPL-SCNC: 101 MMOL/L (ref 96–106)
CHOLEST SERPL-MCNC: 179 MG/DL (ref 100–199)
CO2 SERPL-SCNC: 24 MMOL/L (ref 20–29)
CREAT SERPL-MCNC: 1.08 MG/DL (ref 0.76–1.27)
ERYTHROCYTE [DISTWIDTH] IN BLOOD BY AUTOMATED COUNT: 13.3 % (ref 12.3–15.4)
EST. AVERAGE GLUCOSE BLD GHB EST-MCNC: 105 MG/DL
GLUCOSE SERPL-MCNC: 90 MG/DL (ref 65–99)
HBA1C MFR BLD: 5.3 % (ref 4.8–5.6)
HCT VFR BLD AUTO: 45.1 % (ref 37.5–51)
HDLC SERPL-MCNC: 36 MG/DL
HGB BLD-MCNC: 15.2 G/DL (ref 13–17.7)
INTERPRETATION, 910389: NORMAL
LDLC SERPL CALC-MCNC: 130 MG/DL (ref 0–99)
MCH RBC QN AUTO: 30.3 PG (ref 26.6–33)
MCHC RBC AUTO-ENTMCNC: 33.7 G/DL (ref 31.5–35.7)
MCV RBC AUTO: 90 FL (ref 79–97)
PLATELET # BLD AUTO: 239 X10E3/UL (ref 150–379)
POTASSIUM SERPL-SCNC: 4.9 MMOL/L (ref 3.5–5.2)
RBC # BLD AUTO: 5.02 X10E6/UL (ref 4.14–5.8)
SODIUM SERPL-SCNC: 142 MMOL/L (ref 134–144)
TRIGL SERPL-MCNC: 67 MG/DL (ref 0–149)
TSH SERPL DL<=0.005 MIU/L-ACNC: 1.78 UIU/ML (ref 0.45–4.5)
VLDLC SERPL CALC-MCNC: 13 MG/DL (ref 5–40)
WBC # BLD AUTO: 5.5 X10E3/UL (ref 3.4–10.8)

## 2018-09-24 NOTE — PROGRESS NOTES
Your labs look OK. You have a 5.7% chance of developing heart disease based on your current risks. Because of this I suggest:  Continue aspirin as we discussed. See Dr. Ellen Shin for heart check.

## 2018-09-25 ENCOUNTER — OFFICE VISIT (OUTPATIENT)
Dept: CARDIOLOGY CLINIC | Age: 53
End: 2018-09-25

## 2018-09-25 VITALS
WEIGHT: 186.4 LBS | OXYGEN SATURATION: 100 % | HEIGHT: 72 IN | BODY MASS INDEX: 25.25 KG/M2 | DIASTOLIC BLOOD PRESSURE: 84 MMHG | RESPIRATION RATE: 18 BRPM | SYSTOLIC BLOOD PRESSURE: 134 MMHG | HEART RATE: 97 BPM

## 2018-09-25 DIAGNOSIS — R06.02 SOB (SHORTNESS OF BREATH): Primary | ICD-10-CM

## 2018-09-25 NOTE — PROGRESS NOTES
Donnell Rahcel DO  [facility] [address] [place] [address], [address] [place] [address], [address] [place] [address]                                       Office [phone],[facility] [phone]       Hernan Jimenez is a 47 y.o. male referred to the office for exertional dyspnea. Consult requested by Irvin Guerin MD.    Assessment/Recommendations:  Exertional dyspnea-he notes to be under a lot of stress recently and also reported having increased dyspnea on exertion over the last several months. He continues to exercise but feels like he is limited by his dyspnea. No chest discomfort. Strong family history of coronary artery disease in his dad. Symptoms may represent angina equivalent.  -Recommend exercise myocardial perfusion imaging to evaluate his exertional dyspnea. - Patient asked if he can continue his exercise regimen. Recommended to continue his current exercise regimen. However, would limit his exercise based on his symptoms. Recommended to back off his exercise regimen if he has increasing shortness of breath or develops chest discomfort with exertion. Lightheaded dizziness-appears to have symptoms consistent with orthostasis and volume depletion. He reports having changes in vision with lightheadedness when he stands up quickly arises first thing in the morning from bed. He also notes having some dizziness after standing for long periods of time at work. -Recommend hydration and salt loading for possible orthostasis. History of hypertension-was recently started on hydrochlorothiazide and bisoprolol. He is currently not taking these medications because he does not like to take prescription medications. He states that he has been taking garlic supplementation which has been controlling his blood pressure. He rather take the approach of the garlic therapy over his diuretic and beta-blocker. Blood pressure upper limits normal clinic visit today.     Follow-up in 3 months sooner as needed    Subjective:  71-year-old male who presents to clinic for evaluation of exertional dyspnea. He is referred by his primary care physician Irwin Antonio MD.  He was recently diagnosed with hypertension and started on therapy of bisoprolol and hydrochlorothiazide. He is currently not taking his medication consistently taken prescription medications. He states that he is using garlic to control hypertension. His main complaints today is feeling fatigued lightheaded and dizzy upon changing positions and after standing at long periods at work. He recently noted having significant exertional dyspnea after walking up several flights of stairs. He says this is new for him. He normally exercises regularly without any exertional dyspnea. He notes exertional dyspnea started over the last several weeks. He wonders if this represents having a coronary artery disease or is just from being deconditioned. He does have a family history of coronary artery disease with his dad having previous coronary stenting in his 62s and passed away in his late 76s from a myocardial infarction. Regards to the lightheaded dizziness he said he noticed it initially in the beginning of July. There was an episode where he stood out of bed quickly and had a little blurred vision with lightheaded and dizziness. He has since noted after work he will feel a little bit more lightheaded and dizzy after standing for several hours. The lightheaded and dizziness is worse when he changes in the supine to standing position quickly. Past Medical History:   Diagnosis Date    Family history of skin cancer     Gout     Nephrolithiasis     Sun-damaged skin         History reviewed. No pertinent surgical history. Current Outpatient Prescriptions on File Prior to Visit   Medication Sig Dispense Refill    fluticasone (FLONASE) 50 mcg/actuation nasal spray 2 Sprays by Both Nostrils route daily.  1 Bottle 11    busPIRone (BUSPAR) 10 mg tablet Take 1 Tab by mouth two (2) times daily as needed. (Patient not taking: Reported on 9/25/2018) 30 Tab 1     No current facility-administered medications on file prior to visit. No Known Allergies     Family History   Problem Relation Age of Onset   [address] [facility] Cancer Mother    Father had coronary artery disease in his 62s and passed away in 76s from myocardial infarction        Review of Symptoms:  Pertinent Positive: Exertional dyspnea lightheadedness dizziness  Pertinent Negative: No chest pain orthopnea PND  All Other systems reviewed and are negative for a Comprehensive ROS (10+)    Physical Exam    Blood pressure 134/84, pulse 97, resp. rate 18, height 6' (1.829 m), weight 186 lb 6.4 oz (84.6 kg), SpO2 100 %. Constitutional:  well-developed and well-nourished. No distress. HENT: Normocephalic. Eyes: No scleral icterus. Neck:  Neck supple. No JVD present. Pulmonary/Chest: Effort normal and breath sounds normal. No respiratory distress, wheezes or rales. Cardiovascular: Normal rate, regular rhythm, S1 S2 . Exam reveals no gallop and no friction rub. No murmur heard. No edema. Extremities:  Normal muscle tone  Abdominal:   No abnormal distension. Neurological:  Moving all extremities, cranial nerves appear grossly intact. Skin: Skin is not cold. Not diaphoretic. No erythema. Psychiatric:  Grossly normal mood and affect. Intact insight.     Objective Data:    ECG: personally reviewed and  intrepreted  Normal sinus rhythm normal EKG

## 2018-09-25 NOTE — PROGRESS NOTES
Chief Complaint   Patient presents with    New Patient     Patient presents today as a new patient referred by Dr. Irvin Guerin    Breathing Problem     dyspnea on exertion    Hypertension       Visit Vitals    /84 (BP 1 Location: Left arm, BP Patient Position: Sitting)    Pulse 97    Resp 18    Ht 6' (1.829 m)    Wt 186 lb 6.4 oz (84.6 kg)    SpO2 100%    BMI 25.28 kg/m2

## 2018-10-02 ENCOUNTER — CLINICAL SUPPORT (OUTPATIENT)
Dept: CARDIOLOGY CLINIC | Age: 53
End: 2018-10-02

## 2018-10-02 DIAGNOSIS — R42 DIZZY: ICD-10-CM

## 2018-10-02 DIAGNOSIS — I10 ESSENTIAL HYPERTENSION, BENIGN: ICD-10-CM

## 2018-10-02 DIAGNOSIS — I10 ESSENTIAL HYPERTENSION: Primary | ICD-10-CM

## 2018-10-02 DIAGNOSIS — R06.02 SOB (SHORTNESS OF BREATH): ICD-10-CM

## 2018-10-02 DIAGNOSIS — R06.02 SHORTNESS OF BREATH: ICD-10-CM

## 2018-10-02 NOTE — PROGRESS NOTES
See scanned document. Ordering Doctor:Dr. Naomi Schmitt  Reading Doctor:Dr. Naomi Schmitt    Patient tolerated procedure well.